# Patient Record
Sex: MALE | Race: WHITE
[De-identification: names, ages, dates, MRNs, and addresses within clinical notes are randomized per-mention and may not be internally consistent; named-entity substitution may affect disease eponyms.]

---

## 2021-06-14 ENCOUNTER — HOSPITAL ENCOUNTER (EMERGENCY)
Dept: HOSPITAL 8 - ED | Age: 26
Discharge: HOME | End: 2021-06-14
Payer: SELF-PAY

## 2021-06-14 VITALS — WEIGHT: 168.87 LBS | HEIGHT: 67 IN | BODY MASS INDEX: 26.51 KG/M2

## 2021-06-14 VITALS — DIASTOLIC BLOOD PRESSURE: 75 MMHG | SYSTOLIC BLOOD PRESSURE: 111 MMHG

## 2021-06-14 DIAGNOSIS — B34.9: ICD-10-CM

## 2021-06-14 DIAGNOSIS — J06.9: ICD-10-CM

## 2021-06-14 DIAGNOSIS — R07.89: ICD-10-CM

## 2021-06-14 DIAGNOSIS — J01.00: Primary | ICD-10-CM

## 2021-06-14 DIAGNOSIS — Z20.822: ICD-10-CM

## 2021-06-14 PROCEDURE — 99284 EMERGENCY DEPT VISIT MOD MDM: CPT

## 2021-06-14 PROCEDURE — U0003 INFECTIOUS AGENT DETECTION BY NUCLEIC ACID (DNA OR RNA); SEVERE ACUTE RESPIRATORY SYNDROME CORONAVIRUS 2 (SARS-COV-2) (CORONAVIRUS DISEASE [COVID-19]), AMPLIFIED PROBE TECHNIQUE, MAKING USE OF HIGH THROUGHPUT TECHNOLOGIES AS DESCRIBED BY CMS-2020-01-R: HCPCS

## 2021-06-14 PROCEDURE — 71046 X-RAY EXAM CHEST 2 VIEWS: CPT

## 2021-06-14 NOTE — NUR
pt educated on discharge instructions, prescription, anf follow-up, verbalized 
understanding. ambulatory to discharge with steady gait, nadn.

## 2021-06-14 NOTE — NUR
PT WITH C/O GEN MALAISE, SORE THROAT, COUGH, CONGESTION, JONI EAR PAIN SINCE 
THURSDAY.  BP AND SP02 MONITORS IN PLACE, CALL LIGHT W/I REACH, VSS.  ER 
PROVIDER EVAL PENDING.